# Patient Record
Sex: MALE | Race: WHITE | NOT HISPANIC OR LATINO
[De-identification: names, ages, dates, MRNs, and addresses within clinical notes are randomized per-mention and may not be internally consistent; named-entity substitution may affect disease eponyms.]

---

## 2017-01-20 ENCOUNTER — APPOINTMENT (OUTPATIENT)
Dept: ELECTROPHYSIOLOGY | Facility: CLINIC | Age: 67
End: 2017-01-20

## 2017-01-31 ENCOUNTER — MEDICATION RENEWAL (OUTPATIENT)
Age: 67
End: 2017-01-31

## 2017-02-21 ENCOUNTER — APPOINTMENT (OUTPATIENT)
Dept: ELECTROPHYSIOLOGY | Facility: CLINIC | Age: 67
End: 2017-02-21

## 2017-03-06 ENCOUNTER — MEDICATION RENEWAL (OUTPATIENT)
Age: 67
End: 2017-03-06

## 2017-03-13 ENCOUNTER — MEDICATION RENEWAL (OUTPATIENT)
Age: 67
End: 2017-03-13

## 2017-03-14 ENCOUNTER — APPOINTMENT (OUTPATIENT)
Dept: ELECTROPHYSIOLOGY | Facility: CLINIC | Age: 67
End: 2017-03-14

## 2017-03-24 ENCOUNTER — APPOINTMENT (OUTPATIENT)
Dept: ELECTROPHYSIOLOGY | Facility: CLINIC | Age: 67
End: 2017-03-24

## 2017-03-27 ENCOUNTER — MEDICATION RENEWAL (OUTPATIENT)
Age: 67
End: 2017-03-27

## 2017-04-10 ENCOUNTER — MEDICATION RENEWAL (OUTPATIENT)
Age: 67
End: 2017-04-10

## 2017-04-12 ENCOUNTER — APPOINTMENT (OUTPATIENT)
Dept: ELECTROPHYSIOLOGY | Facility: CLINIC | Age: 67
End: 2017-04-12

## 2017-04-12 VITALS
SYSTOLIC BLOOD PRESSURE: 148 MMHG | DIASTOLIC BLOOD PRESSURE: 83 MMHG | OXYGEN SATURATION: 97 % | WEIGHT: 236 LBS | BODY MASS INDEX: 33.86 KG/M2 | HEART RATE: 92 BPM

## 2017-04-24 ENCOUNTER — APPOINTMENT (OUTPATIENT)
Dept: ELECTROPHYSIOLOGY | Facility: CLINIC | Age: 67
End: 2017-04-24

## 2017-05-26 ENCOUNTER — APPOINTMENT (OUTPATIENT)
Dept: ELECTROPHYSIOLOGY | Facility: CLINIC | Age: 67
End: 2017-05-26

## 2017-06-05 ENCOUNTER — OTHER (OUTPATIENT)
Age: 67
End: 2017-06-05

## 2017-06-05 ENCOUNTER — RX RENEWAL (OUTPATIENT)
Age: 67
End: 2017-06-05

## 2017-06-26 ENCOUNTER — APPOINTMENT (OUTPATIENT)
Dept: ELECTROPHYSIOLOGY | Facility: CLINIC | Age: 67
End: 2017-06-26

## 2017-07-21 ENCOUNTER — RX RENEWAL (OUTPATIENT)
Age: 67
End: 2017-07-21

## 2017-07-28 ENCOUNTER — APPOINTMENT (OUTPATIENT)
Dept: ELECTROPHYSIOLOGY | Facility: CLINIC | Age: 67
End: 2017-07-28
Payer: MEDICARE

## 2017-07-28 PROCEDURE — 93299: CPT

## 2017-07-28 PROCEDURE — 93298 REM INTERROG DEV EVAL SCRMS: CPT

## 2017-08-07 ENCOUNTER — RX RENEWAL (OUTPATIENT)
Age: 67
End: 2017-08-07

## 2017-08-22 ENCOUNTER — MEDICATION RENEWAL (OUTPATIENT)
Age: 67
End: 2017-08-22

## 2017-08-28 ENCOUNTER — APPOINTMENT (OUTPATIENT)
Dept: ELECTROPHYSIOLOGY | Facility: CLINIC | Age: 67
End: 2017-08-28
Payer: MEDICARE

## 2017-08-28 PROCEDURE — 93299: CPT

## 2017-08-28 PROCEDURE — 93298 REM INTERROG DEV EVAL SCRMS: CPT

## 2017-09-18 ENCOUNTER — APPOINTMENT (OUTPATIENT)
Dept: CARDIOLOGY | Facility: CLINIC | Age: 67
End: 2017-09-18
Payer: MEDICARE

## 2017-09-18 ENCOUNTER — NON-APPOINTMENT (OUTPATIENT)
Age: 67
End: 2017-09-18

## 2017-09-18 VITALS
HEART RATE: 54 BPM | SYSTOLIC BLOOD PRESSURE: 129 MMHG | DIASTOLIC BLOOD PRESSURE: 79 MMHG | OXYGEN SATURATION: 97 % | WEIGHT: 242 LBS | BODY MASS INDEX: 34.72 KG/M2

## 2017-09-18 DIAGNOSIS — Z87.898 PERSONAL HISTORY OF OTHER SPECIFIED CONDITIONS: ICD-10-CM

## 2017-09-18 PROCEDURE — 93000 ELECTROCARDIOGRAM COMPLETE: CPT

## 2017-09-18 PROCEDURE — 99204 OFFICE O/P NEW MOD 45 MIN: CPT

## 2017-09-29 ENCOUNTER — APPOINTMENT (OUTPATIENT)
Dept: ELECTROPHYSIOLOGY | Facility: CLINIC | Age: 67
End: 2017-09-29
Payer: MEDICARE

## 2017-09-29 PROCEDURE — 93299: CPT

## 2017-09-29 PROCEDURE — 93298 REM INTERROG DEV EVAL SCRMS: CPT

## 2017-10-05 ENCOUNTER — MEDICATION RENEWAL (OUTPATIENT)
Age: 67
End: 2017-10-05

## 2017-10-30 ENCOUNTER — APPOINTMENT (OUTPATIENT)
Dept: ELECTROPHYSIOLOGY | Facility: CLINIC | Age: 67
End: 2017-10-30
Payer: MEDICARE

## 2017-10-30 PROCEDURE — 93298 REM INTERROG DEV EVAL SCRMS: CPT

## 2017-10-30 PROCEDURE — 93299: CPT

## 2017-11-22 ENCOUNTER — APPOINTMENT (OUTPATIENT)
Dept: ELECTROPHYSIOLOGY | Facility: CLINIC | Age: 67
End: 2017-11-22

## 2017-11-30 ENCOUNTER — MEDICATION RENEWAL (OUTPATIENT)
Age: 67
End: 2017-11-30

## 2017-12-01 ENCOUNTER — APPOINTMENT (OUTPATIENT)
Dept: ELECTROPHYSIOLOGY | Facility: CLINIC | Age: 67
End: 2017-12-01
Payer: MEDICARE

## 2017-12-01 PROCEDURE — 93298 REM INTERROG DEV EVAL SCRMS: CPT

## 2017-12-01 PROCEDURE — 93299: CPT

## 2017-12-13 ENCOUNTER — APPOINTMENT (OUTPATIENT)
Dept: ELECTROPHYSIOLOGY | Facility: CLINIC | Age: 67
End: 2017-12-13

## 2018-01-02 ENCOUNTER — APPOINTMENT (OUTPATIENT)
Dept: ELECTROPHYSIOLOGY | Facility: CLINIC | Age: 68
End: 2018-01-02
Payer: MEDICARE

## 2018-01-02 PROCEDURE — 93298 REM INTERROG DEV EVAL SCRMS: CPT

## 2018-01-02 PROCEDURE — 93299: CPT

## 2018-01-08 ENCOUNTER — MEDICATION RENEWAL (OUTPATIENT)
Age: 68
End: 2018-01-08

## 2018-02-02 ENCOUNTER — APPOINTMENT (OUTPATIENT)
Dept: ELECTROPHYSIOLOGY | Facility: CLINIC | Age: 68
End: 2018-02-02
Payer: MEDICARE

## 2018-02-02 PROCEDURE — 93299: CPT

## 2018-02-02 PROCEDURE — 93298 REM INTERROG DEV EVAL SCRMS: CPT

## 2018-03-05 ENCOUNTER — APPOINTMENT (OUTPATIENT)
Dept: ELECTROPHYSIOLOGY | Facility: CLINIC | Age: 68
End: 2018-03-05
Payer: MEDICARE

## 2018-03-05 PROCEDURE — 93299: CPT

## 2018-03-05 PROCEDURE — 93298 REM INTERROG DEV EVAL SCRMS: CPT

## 2018-04-06 ENCOUNTER — APPOINTMENT (OUTPATIENT)
Dept: ELECTROPHYSIOLOGY | Facility: CLINIC | Age: 68
End: 2018-04-06
Payer: MEDICARE

## 2018-04-06 PROCEDURE — 93298 REM INTERROG DEV EVAL SCRMS: CPT

## 2018-04-06 PROCEDURE — 93299: CPT

## 2018-04-20 ENCOUNTER — MEDICATION RENEWAL (OUTPATIENT)
Age: 68
End: 2018-04-20

## 2018-05-07 ENCOUNTER — APPOINTMENT (OUTPATIENT)
Dept: ELECTROPHYSIOLOGY | Facility: CLINIC | Age: 68
End: 2018-05-07
Payer: MEDICARE

## 2018-05-07 PROCEDURE — 93298 REM INTERROG DEV EVAL SCRMS: CPT

## 2018-05-07 PROCEDURE — 93299: CPT

## 2018-05-21 ENCOUNTER — APPOINTMENT (OUTPATIENT)
Dept: CARDIOLOGY | Facility: CLINIC | Age: 68
End: 2018-05-21
Payer: MEDICARE

## 2018-05-21 ENCOUNTER — NON-APPOINTMENT (OUTPATIENT)
Age: 68
End: 2018-05-21

## 2018-05-21 VITALS
WEIGHT: 240 LBS | OXYGEN SATURATION: 97 % | DIASTOLIC BLOOD PRESSURE: 64 MMHG | HEART RATE: 117 BPM | SYSTOLIC BLOOD PRESSURE: 130 MMHG | BODY MASS INDEX: 34.44 KG/M2

## 2018-05-21 DIAGNOSIS — C61 MALIGNANT NEOPLASM OF PROSTATE: ICD-10-CM

## 2018-05-21 PROCEDURE — 93000 ELECTROCARDIOGRAM COMPLETE: CPT

## 2018-05-21 PROCEDURE — 99214 OFFICE O/P EST MOD 30 MIN: CPT

## 2018-06-08 ENCOUNTER — MEDICATION RENEWAL (OUTPATIENT)
Age: 68
End: 2018-06-08

## 2018-06-08 ENCOUNTER — APPOINTMENT (OUTPATIENT)
Dept: ELECTROPHYSIOLOGY | Facility: CLINIC | Age: 68
End: 2018-06-08
Payer: MEDICARE

## 2018-06-08 PROCEDURE — 93298 REM INTERROG DEV EVAL SCRMS: CPT

## 2018-06-08 PROCEDURE — 93299: CPT

## 2018-06-11 ENCOUNTER — RX RENEWAL (OUTPATIENT)
Age: 68
End: 2018-06-11

## 2018-06-12 ENCOUNTER — MEDICATION RENEWAL (OUTPATIENT)
Age: 68
End: 2018-06-12

## 2018-06-19 ENCOUNTER — APPOINTMENT (OUTPATIENT)
Dept: CARDIOLOGY | Facility: CLINIC | Age: 68
End: 2018-06-19

## 2018-07-09 ENCOUNTER — APPOINTMENT (OUTPATIENT)
Dept: ELECTROPHYSIOLOGY | Facility: CLINIC | Age: 68
End: 2018-07-09
Payer: MEDICARE

## 2018-07-09 PROCEDURE — 93298 REM INTERROG DEV EVAL SCRMS: CPT

## 2018-07-09 PROCEDURE — 93299: CPT

## 2018-08-10 ENCOUNTER — APPOINTMENT (OUTPATIENT)
Dept: ELECTROPHYSIOLOGY | Facility: CLINIC | Age: 68
End: 2018-08-10
Payer: MEDICARE

## 2018-08-10 PROCEDURE — 93298 REM INTERROG DEV EVAL SCRMS: CPT

## 2018-08-10 PROCEDURE — 93299: CPT

## 2018-09-10 ENCOUNTER — APPOINTMENT (OUTPATIENT)
Dept: ELECTROPHYSIOLOGY | Facility: CLINIC | Age: 68
End: 2018-09-10
Payer: MEDICARE

## 2018-09-10 PROCEDURE — 93299: CPT

## 2018-09-10 PROCEDURE — 93298 REM INTERROG DEV EVAL SCRMS: CPT

## 2018-10-12 ENCOUNTER — APPOINTMENT (OUTPATIENT)
Dept: ELECTROPHYSIOLOGY | Facility: CLINIC | Age: 68
End: 2018-10-12
Payer: MEDICARE

## 2018-10-12 PROCEDURE — 93298 REM INTERROG DEV EVAL SCRMS: CPT

## 2018-10-12 PROCEDURE — 93299: CPT

## 2018-10-22 ENCOUNTER — MEDICATION RENEWAL (OUTPATIENT)
Age: 68
End: 2018-10-22

## 2018-10-23 ENCOUNTER — MEDICATION RENEWAL (OUTPATIENT)
Age: 68
End: 2018-10-23

## 2018-11-12 ENCOUNTER — APPOINTMENT (OUTPATIENT)
Dept: ELECTROPHYSIOLOGY | Facility: CLINIC | Age: 68
End: 2018-11-12
Payer: MEDICARE

## 2018-11-12 PROCEDURE — 93298 REM INTERROG DEV EVAL SCRMS: CPT

## 2018-11-12 PROCEDURE — 93299: CPT

## 2018-11-27 ENCOUNTER — NON-APPOINTMENT (OUTPATIENT)
Age: 68
End: 2018-11-27

## 2018-11-27 ENCOUNTER — APPOINTMENT (OUTPATIENT)
Dept: CARDIOLOGY | Facility: CLINIC | Age: 68
End: 2018-11-27
Payer: MEDICARE

## 2018-11-27 VITALS
HEIGHT: 70 IN | RESPIRATION RATE: 16 BRPM | BODY MASS INDEX: 34.07 KG/M2 | SYSTOLIC BLOOD PRESSURE: 138 MMHG | TEMPERATURE: 98 F | WEIGHT: 238 LBS | OXYGEN SATURATION: 98 % | DIASTOLIC BLOOD PRESSURE: 83 MMHG | HEART RATE: 53 BPM

## 2018-11-27 DIAGNOSIS — Z72.0 TOBACCO USE: ICD-10-CM

## 2018-11-27 PROCEDURE — 93000 ELECTROCARDIOGRAM COMPLETE: CPT

## 2018-11-27 PROCEDURE — 99214 OFFICE O/P EST MOD 30 MIN: CPT

## 2018-11-27 NOTE — REASON FOR VISIT
[Follow-Up - Clinic] : a clinic follow-up of [Atrial Fibrillation] : atrial fibrillation [Hyperlipidemia] : hyperlipidemia [Hypertension] : hypertension [FreeTextEntry1] : perioperative risk stratification

## 2018-11-27 NOTE — REVIEW OF SYSTEMS
[Eyeglasses] : currently wearing eyeglasses [Dyspnea on exertion] : dyspnea during exertion [Negative] : Heme/Lymph [Shortness Of Breath] : no shortness of breath [Chest Pain] : no chest pain [Lower Ext Edema] : no extremity edema [Palpitations] : no palpitations

## 2018-11-27 NOTE — HISTORY OF PRESENT ILLNESS
[FreeTextEntry1] : 67 male with HTN, Hyperlipidemia , ALTHEA, smoker, afib s/p DCCV April 2013, atrial flutter despite being on Multaq, underwent atrial flutter ablation in 2013, started on lasix due to LE edema. ILR implanted in 2016 which has demonstrated PAF.  He remains on eliquis. Lipid panel checked by PCP - reports normal. Overall, feels well.  No PND, orthopnea, LE edema.  No palpitations, dizziness, syncope. No chest pain or shortness of breath. No bleeding.  Golfs regularly.  Not compliant with CPAP "I would rather die in my sleep" because it keeps him up at night. Occasional etoh (6 pack) per week.  Stress test "many years ago" nml, never needed referral for coronary angiogram. Denies hx of heart failure. \par \par 5/21/18 Returns for follow up.  In the interim, diagnosed with prostate cancer.  Going for cryotherapy 5/30/18.   Continues to smoke.  No chest pain or shortness of breath.  No palpitations, dizziness, syncope.  METS > 4. Had some bleeding after the prostate biopsy.  \par \par 11/27/18 Returns for follow up.  In the interim, prostate cancer is in remission, s/p cryotherapy.  No chest pain, sob, palpitations, dizziness, bleeding.   Tolerating meds.  No significant swelling, no PND or orthopnea.  \par

## 2018-11-27 NOTE — PHYSICAL EXAM
[Well Groomed] : well groomed [General Appearance - Well Nourished] : well nourished [General Appearance - In No Acute Distress] : no acute distress [Normal Conjunctiva] : the conjunctiva exhibited no abnormalities [Eyelids - No Xanthelasma] : the eyelids demonstrated no xanthelasmas [Normal Oral Mucosa] : normal oral mucosa [Normal Oropharynx] : normal oropharynx [Respiration, Rhythm And Depth] : normal respiratory rhythm and effort [Exaggerated Use Of Accessory Muscles For Inspiration] : no accessory muscle use [Heart Rate And Rhythm] : heart rate and rhythm were normal [Heart Sounds] : normal S1 and S2 [Murmurs] : no murmurs present [Arterial Pulses Normal] : the arterial pulses were normal [Edema] : no peripheral edema present [Veins - Varicosity Changes] : no varicosital changes were noted in the lower extremities [Bowel Sounds] : normal bowel sounds [Abdomen Soft] : soft [Abdomen Tenderness] : non-tender [Abnormal Walk] : normal gait [Nail Clubbing] : no clubbing of the fingernails [Cyanosis, Localized] : no localized cyanosis [Skin Color & Pigmentation] : normal skin color and pigmentation [Skin Turgor] : normal skin turgor [] : no rash [No Venous Stasis] : no venous stasis [No Skin Ulcers] : no skin ulcer [No Xanthoma] : no  xanthoma was observed [Oriented To Time, Place, And Person] : oriented to person, place, and time [Impaired Insight] : insight and judgment were intact [Affect] : the affect was normal [Mood] : the mood was normal [No Anxiety] : not feeling anxious [Auscultation Breath Sounds / Voice Sounds] : lungs were clear to auscultation bilaterally [FreeTextEntry1] : distant heart sounds

## 2018-11-27 NOTE — ASSESSMENT
[FreeTextEntry1] : 1. paroxysmal atrial fibrillation- well controlled on dronedarone, metoprolol. Continue AC with eliquis. Continued follow up with Dr. Beck for EP. \par 2. atrial flutter s/p ablation- no recurrence since ablation. Refilled multaq. \par 3. hypertension- well controlled on amlodipine/furosemide/metoprolol. Increase exercise, weight loss. \par 4. hyperlipidemia- stable on crestor.  PCP checking yearly.  LDL goal < 100 in setting of risk factors such as smoking/htn. Increase exercise, weight loss. \par 5. smoker- long discussion regarding cessation.  He is currently not motivated to quit.  Offered therapies - gum/patch/chantix/ smoking cessation programs at [facility]. .  He defers.  Urged him to set a quit date when ready to quit. \par 6. alcohol use - discussed importance to refrain from binging, increasing bleeding risk\par 7. ALTHEA - discussed compliance with mask, long term cardiovascular effects including worsening afib. Adamant and not interested in wearing mask\par \par Thank you for allowing me to participate in your patient's care.  Please contact me if there are any questions or concerns.\par \par RTC 9 months (earlier if needed)

## 2018-12-14 ENCOUNTER — APPOINTMENT (OUTPATIENT)
Dept: ELECTROPHYSIOLOGY | Facility: CLINIC | Age: 68
End: 2018-12-14
Payer: MEDICARE

## 2018-12-14 PROCEDURE — 93298 REM INTERROG DEV EVAL SCRMS: CPT

## 2018-12-14 PROCEDURE — 93299: CPT

## 2019-01-14 ENCOUNTER — APPOINTMENT (OUTPATIENT)
Dept: ELECTROPHYSIOLOGY | Facility: CLINIC | Age: 69
End: 2019-01-14
Payer: MEDICARE

## 2019-01-14 PROCEDURE — 93298 REM INTERROG DEV EVAL SCRMS: CPT

## 2019-01-14 PROCEDURE — 93299: CPT

## 2019-02-15 ENCOUNTER — APPOINTMENT (OUTPATIENT)
Dept: ELECTROPHYSIOLOGY | Facility: CLINIC | Age: 69
End: 2019-02-15
Payer: MEDICARE

## 2019-02-15 PROCEDURE — 93298 REM INTERROG DEV EVAL SCRMS: CPT

## 2019-02-15 PROCEDURE — 93299: CPT

## 2019-02-22 ENCOUNTER — RX RENEWAL (OUTPATIENT)
Age: 69
End: 2019-02-22

## 2019-03-22 ENCOUNTER — APPOINTMENT (OUTPATIENT)
Dept: ELECTROPHYSIOLOGY | Facility: CLINIC | Age: 69
End: 2019-03-22
Payer: MEDICARE

## 2019-03-22 PROCEDURE — 93298 REM INTERROG DEV EVAL SCRMS: CPT

## 2019-03-22 PROCEDURE — 93299: CPT

## 2019-04-01 ENCOUNTER — RX RENEWAL (OUTPATIENT)
Age: 69
End: 2019-04-01

## 2019-04-22 ENCOUNTER — APPOINTMENT (OUTPATIENT)
Dept: ELECTROPHYSIOLOGY | Facility: CLINIC | Age: 69
End: 2019-04-22
Payer: MEDICARE

## 2019-04-22 PROCEDURE — 93298 REM INTERROG DEV EVAL SCRMS: CPT

## 2019-04-22 PROCEDURE — 93299: CPT

## 2019-04-25 ENCOUNTER — RX RENEWAL (OUTPATIENT)
Age: 69
End: 2019-04-25

## 2019-05-24 ENCOUNTER — APPOINTMENT (OUTPATIENT)
Dept: ELECTROPHYSIOLOGY | Facility: CLINIC | Age: 69
End: 2019-05-24
Payer: MEDICARE

## 2019-05-24 PROCEDURE — 93298 REM INTERROG DEV EVAL SCRMS: CPT

## 2019-05-24 PROCEDURE — 93299: CPT

## 2019-06-04 ENCOUNTER — RX RENEWAL (OUTPATIENT)
Age: 69
End: 2019-06-04

## 2019-06-12 ENCOUNTER — MEDICATION RENEWAL (OUTPATIENT)
Age: 69
End: 2019-06-12

## 2019-06-12 ENCOUNTER — APPOINTMENT (OUTPATIENT)
Dept: CARDIOLOGY | Facility: CLINIC | Age: 69
End: 2019-06-12

## 2019-06-24 ENCOUNTER — APPOINTMENT (OUTPATIENT)
Dept: ELECTROPHYSIOLOGY | Facility: CLINIC | Age: 69
End: 2019-06-24
Payer: MEDICARE

## 2019-06-24 PROCEDURE — 93298 REM INTERROG DEV EVAL SCRMS: CPT

## 2019-06-24 PROCEDURE — 93299: CPT

## 2019-07-09 ENCOUNTER — APPOINTMENT (OUTPATIENT)
Dept: ELECTROPHYSIOLOGY | Facility: CLINIC | Age: 69
End: 2019-07-09
Payer: MEDICARE

## 2019-07-09 VITALS
DIASTOLIC BLOOD PRESSURE: 79 MMHG | HEIGHT: 70 IN | SYSTOLIC BLOOD PRESSURE: 133 MMHG | OXYGEN SATURATION: 99 % | WEIGHT: 240 LBS | BODY MASS INDEX: 34.36 KG/M2 | HEART RATE: 74 BPM

## 2019-07-09 PROCEDURE — 93000 ELECTROCARDIOGRAM COMPLETE: CPT

## 2019-07-09 PROCEDURE — 99205 OFFICE O/P NEW HI 60 MIN: CPT | Mod: 25

## 2019-07-09 NOTE — REASON FOR VISIT
[Consultation] : a consultation regarding [Atrial Fibrillation] : atrial fibrillation [FreeTextEntry1] : Referring: Dr. Brown (former pt of Dr. Beck)

## 2019-07-09 NOTE — REVIEW OF SYSTEMS
[Eyeglasses] : currently wearing eyeglasses [Dyspnea on exertion] : dyspnea during exertion [Shortness Of Breath] : no shortness of breath [Chest Pain] : no chest pain [Lower Ext Edema] : no extremity edema [Palpitations] : no palpitations [Negative] : Heme/Lymph

## 2019-07-09 NOTE — HISTORY OF PRESENT ILLNESS
[FreeTextEntry1] : Mr. Barron is a very pleasant 65 yo male with a past medical history of HTN, hyperlipidemia, s/p typical flutter ablation, and paroxsymal AF with Multaq/ Eliquis s/p ILR implantation who presents to establish care with new electrophysiology provider. \par \par He states he continues to feel well. He denies any CP, [place], orthopnea/PND/LE edema, LH/dizziness/syncope, palpitations, or n/v/diaphoresis. \par

## 2019-07-09 NOTE — PHYSICAL EXAM
[Well Groomed] : well groomed [General Appearance - Well Nourished] : well nourished [General Appearance - In No Acute Distress] : no acute distress [Normal Conjunctiva] : the conjunctiva exhibited no abnormalities [Normal Oral Mucosa] : normal oral mucosa [Normal Oropharynx] : normal oropharynx [Eyelids - No Xanthelasma] : the eyelids demonstrated no xanthelasmas [Heart Rate And Rhythm] : heart rate and rhythm were normal [Murmurs] : no murmurs present [Heart Sounds] : normal S1 and S2 [Arterial Pulses Normal] : the arterial pulses were normal [Veins - Varicosity Changes] : no varicosital changes were noted in the lower extremities [Edema] : no peripheral edema present [FreeTextEntry1] : distant heart sounds [Exaggerated Use Of Accessory Muscles For Inspiration] : no accessory muscle use [Respiration, Rhythm And Depth] : normal respiratory rhythm and effort [Auscultation Breath Sounds / Voice Sounds] : lungs were clear to auscultation bilaterally [Bowel Sounds] : normal bowel sounds [Abdomen Soft] : soft [Abdomen Tenderness] : non-tender [Abnormal Walk] : normal gait [Nail Clubbing] : no clubbing of the fingernails [Cyanosis, Localized] : no localized cyanosis [Skin Color & Pigmentation] : normal skin color and pigmentation [Skin Turgor] : normal skin turgor [] : no rash [No Venous Stasis] : no venous stasis [No Skin Ulcers] : no skin ulcer [No Xanthoma] : no  xanthoma was observed [Oriented To Time, Place, And Person] : oriented to person, place, and time [Impaired Insight] : insight and judgment were intact [Affect] : the affect was normal [Mood] : the mood was normal [No Anxiety] : not feeling anxious

## 2019-07-09 NOTE — DISCUSSION/SUMMARY
[FreeTextEntry1] : 70 yo obese M w/HTN, HLD, atrial flutter s/p CTI RFA, and pAF s/p ILR implantation who presents with adequate rhythm control on Multaq. CHADs-VASC= 2 on Eliquis.\par \par Tolerating therapy well. No complaints. \par \par Recommendations:\par 1. Continue Multaq/BB/NOAC\par 2. RTC 6 months (or sooner prn recurrent sxs)

## 2019-07-26 ENCOUNTER — APPOINTMENT (OUTPATIENT)
Dept: ELECTROPHYSIOLOGY | Facility: CLINIC | Age: 69
End: 2019-07-26
Payer: MEDICARE

## 2019-07-26 PROCEDURE — 93299: CPT

## 2019-07-26 PROCEDURE — 93298 REM INTERROG DEV EVAL SCRMS: CPT

## 2019-08-26 ENCOUNTER — APPOINTMENT (OUTPATIENT)
Dept: ELECTROPHYSIOLOGY | Facility: CLINIC | Age: 69
End: 2019-08-26
Payer: MEDICARE

## 2019-08-26 PROCEDURE — 93299: CPT

## 2019-08-26 PROCEDURE — 93298 REM INTERROG DEV EVAL SCRMS: CPT

## 2019-08-27 ENCOUNTER — APPOINTMENT (OUTPATIENT)
Dept: CARDIOLOGY | Facility: CLINIC | Age: 69
End: 2019-08-27
Payer: MEDICARE

## 2019-08-27 VITALS
HEART RATE: 50 BPM | DIASTOLIC BLOOD PRESSURE: 78 MMHG | RESPIRATION RATE: 16 BRPM | SYSTOLIC BLOOD PRESSURE: 130 MMHG | OXYGEN SATURATION: 98 % | HEIGHT: 70 IN | WEIGHT: 234 LBS | BODY MASS INDEX: 33.5 KG/M2

## 2019-08-27 DIAGNOSIS — Z86.79 PERSONAL HISTORY OF OTHER DISEASES OF THE CIRCULATORY SYSTEM: ICD-10-CM

## 2019-08-27 DIAGNOSIS — Z86.39 PERSONAL HISTORY OF OTHER ENDOCRINE, NUTRITIONAL AND METABOLIC DISEASE: ICD-10-CM

## 2019-08-27 DIAGNOSIS — R60.0 LOCALIZED EDEMA: ICD-10-CM

## 2019-08-27 PROCEDURE — 99214 OFFICE O/P EST MOD 30 MIN: CPT

## 2019-08-27 RX ORDER — FOLIC ACID 1 MG/1
1 TABLET ORAL
Qty: 90 | Refills: 1 | Status: ACTIVE | COMMUNITY
Start: 2019-08-27

## 2019-08-27 NOTE — HISTORY OF PRESENT ILLNESS
[FreeTextEntry1] : 70 yo male with HTN, Hyperlipidemia , ALTHEA, smoker, afib s/p DCCV April 2013, atrial flutter despite being on Multaq, underwent atrial flutter ablation in 2013, started on lasix due to LE edema. ILR implanted in 2016 which has demonstrated PAF.  He remains on eliquis. Lipid panel checked by PCP - reports normal. Overall, feels well.  Golfs regularly.  Not compliant with CPAP. Occasional etoh (6 pack) per week.  Stress test "many years ago" nml, never needed referral for coronary angiogram. \par \par 5/21/18 Returns for follow up.  In the interim, diagnosed with prostate cancer.  Going for cryotherapy 5/30/18.   Continues to smoke.  No chest pain or shortness of breath.  No palpitations, dizziness, syncope.  METS > 4. Had some bleeding after the prostate biopsy.  \par \par 11/27/18 Returns for follow up.  In the interim, prostate cancer is in remission, s/p cryotherapy.  No chest pain, sob, palpitations, dizziness, bleeding.   Tolerating meds.  No significant swelling, no PND or orthopnea.  \par \par 8/27/19 Returns for follow up.  In the interim, PAF noted on ILR - 72 hrs, went to [place].  At the time he reports drinking a lot of etoh.  EF on echo reportedly normal.  He reports cholesterol is normal (via PCP).  No bleeding.  \par \par

## 2019-08-27 NOTE — REVIEW OF SYSTEMS
[Eyeglasses] : currently wearing eyeglasses [Dyspnea on exertion] : dyspnea during exertion [Negative] : Endocrine [Shortness Of Breath] : no shortness of breath [Chest Pain] : no chest pain [Lower Ext Edema] : no extremity edema [Palpitations] : no palpitations

## 2019-08-27 NOTE — ASSESSMENT
[FreeTextEntry1] : 1. paroxysmal atrial fibrillation- well controlled on dronedarone, metoprolol. Continue AC with eliquis. Continued follow up with EP/ILR checks.Had episode of sustained afib - hospitalized in [place] 6/2019.  Getting records. \par 2. atrial flutter s/p ablation- no recurrence since ablation. continue dronedarone. \par 3. hypertension- well controlled on amlodipine/furosemide/metoprolol. Increase exercise, weight loss. \par 4. hyperlipidemia- stable on crestor.  PCP checking yearly.  LDL goal < 100 in setting of risk factors such as smoking/htn. Increase exercise, weight loss. \par 5. smoker- reports quitting 4 months ago. \par 6. alcohol use - discussed importance to refrain from binging, increasing bleeding risk\par 7. ALTHEA - discussed compliance with mask, long term cardiovascular effects including worsening afib. Adamant and not interested in wearing mask\par \par \par get records from [place] 6/2018\par no changes in meds\par lipids/lfts/chemistries with PCP\par routine ILR checks\par \par Thank you for allowing me to participate in your patient's care.  Please contact me if there are any questions or concerns.\par \par RTC 9 months (earlier if needed)\par \par

## 2019-08-27 NOTE — PHYSICAL EXAM
[Well Groomed] : well groomed [General Appearance - In No Acute Distress] : no acute distress [General Appearance - Well Nourished] : well nourished [Eyelids - No Xanthelasma] : the eyelids demonstrated no xanthelasmas [Normal Conjunctiva] : the conjunctiva exhibited no abnormalities [Normal Oral Mucosa] : normal oral mucosa [Normal Oropharynx] : normal oropharynx [Respiration, Rhythm And Depth] : normal respiratory rhythm and effort [Exaggerated Use Of Accessory Muscles For Inspiration] : no accessory muscle use [Auscultation Breath Sounds / Voice Sounds] : lungs were clear to auscultation bilaterally [Heart Sounds] : normal S1 and S2 [Heart Rate And Rhythm] : heart rate and rhythm were normal [Murmurs] : no murmurs present [Edema] : no peripheral edema present [Arterial Pulses Normal] : the arterial pulses were normal [Veins - Varicosity Changes] : no varicosital changes were noted in the lower extremities [Bowel Sounds] : normal bowel sounds [Abdomen Soft] : soft [Abdomen Tenderness] : non-tender [Abnormal Walk] : normal gait [Nail Clubbing] : no clubbing of the fingernails [Cyanosis, Localized] : no localized cyanosis [Skin Turgor] : normal skin turgor [Skin Color & Pigmentation] : normal skin color and pigmentation [] : no rash [No Venous Stasis] : no venous stasis [No Xanthoma] : no  xanthoma was observed [No Skin Ulcers] : no skin ulcer [Oriented To Time, Place, And Person] : oriented to person, place, and time [Impaired Insight] : insight and judgment were intact [Affect] : the affect was normal [Mood] : the mood was normal [No Anxiety] : not feeling anxious [FreeTextEntry1] : distant heart sounds

## 2019-09-27 ENCOUNTER — APPOINTMENT (OUTPATIENT)
Dept: ELECTROPHYSIOLOGY | Facility: CLINIC | Age: 69
End: 2019-09-27

## 2019-10-28 ENCOUNTER — APPOINTMENT (OUTPATIENT)
Dept: ELECTROPHYSIOLOGY | Facility: CLINIC | Age: 69
End: 2019-10-28

## 2019-11-18 ENCOUNTER — MEDICATION RENEWAL (OUTPATIENT)
Age: 69
End: 2019-11-18

## 2019-12-18 ENCOUNTER — RX RENEWAL (OUTPATIENT)
Age: 69
End: 2019-12-18

## 2020-01-06 ENCOUNTER — MEDICATION RENEWAL (OUTPATIENT)
Age: 70
End: 2020-01-06

## 2020-01-14 ENCOUNTER — APPOINTMENT (OUTPATIENT)
Dept: ELECTROPHYSIOLOGY | Facility: CLINIC | Age: 70
End: 2020-01-14

## 2020-03-11 ENCOUNTER — APPOINTMENT (OUTPATIENT)
Dept: CARDIOLOGY | Facility: CLINIC | Age: 70
End: 2020-03-11

## 2020-03-19 ENCOUNTER — RX RENEWAL (OUTPATIENT)
Age: 70
End: 2020-03-19

## 2020-04-08 NOTE — DISCUSSION/SUMMARY
[FreeTextEntry1] : In summary this is a 66 year old male with HTN, HL, prostate cancer, ALTHEA (not compliant with CPAP), active smoker (?), CTI-dependent atrial flutter (s/p RFA 6/2013), and paroxysmal AF (on Multaq/Apixaban) s/p ILR implantation who presents for follow up.\par \par Ablation?\par CPAP use?\par EtOH use?\par ILR explant?\par \par Recommendations:\par - \par PATIENT DID NOT SHOW FOR APPOINTMENT

## 2020-04-08 NOTE — REASON FOR VISIT
[Follow-Up - Clinic] : a clinic follow-up of [Atrial Fibrillation] : atrial fibrillation [FreeTextEntry1] : Referring: Dr. Brown (former pt of Dr. Beck/Kirill)

## 2020-04-08 NOTE — HISTORY OF PRESENT ILLNESS
[FreeTextEntry1] : Ray Barron is a 66 year old male with HTN, HL, prostate cancer, ALTHEA (not compliant with CPAP), active smoker (?), CTI-dependent atrial flutter (s/p RFA 6/2013), and paroxysmal AF (on Multaq/Apixaban) s/p ILR implantation who presents for follow up.\par \par To summarize his history, he was first diagnosed with an atrial arrhythmia in 2013 incidentally without any symptoms. He underwent MARI/CV in 4/2013 at [facility]. At the time MARI showed preserved LV function and a PFO with left to right shunting. An ECG on 5/23/13 showed typical atrial flutter. He was evaluated by Dr. Germain who recommended catheter ablation of atrial flutter as he had episodes of dyspnea. On 6/13/13 he underwent catheter ablation of the CTI by Dr. Germain. Following ablation he was maintained on Dronedarone and Metoprolol Succinate. He underwent ILR implantation on 6/3/16 which revealed AF episodes lasting >4 days and so he was subsequently started on Apixaban.\par \par His most recent sustained episode of AF was in 6/2019 at which time he went to [facility]. Per chart review, this occurred in the setting of significant EtOH use and his EF on echo was normal at the time. He required __.\par \par PATIENT DID NOT SHOW FOR APPOINTMENT.\par

## 2020-04-14 ENCOUNTER — APPOINTMENT (OUTPATIENT)
Dept: ELECTROPHYSIOLOGY | Facility: CLINIC | Age: 70
End: 2020-04-14

## 2020-04-14 ENCOUNTER — APPOINTMENT (OUTPATIENT)
Dept: CARDIOLOGY | Facility: CLINIC | Age: 70
End: 2020-04-14

## 2020-06-02 ENCOUNTER — APPOINTMENT (OUTPATIENT)
Dept: CARDIOLOGY | Facility: CLINIC | Age: 70
End: 2020-06-02

## 2020-07-20 ENCOUNTER — NON-APPOINTMENT (OUTPATIENT)
Age: 70
End: 2020-07-20

## 2020-07-20 ENCOUNTER — APPOINTMENT (OUTPATIENT)
Dept: CARDIOLOGY | Facility: CLINIC | Age: 70
End: 2020-07-20
Payer: MEDICARE

## 2020-07-20 VITALS
HEART RATE: 57 BPM | TEMPERATURE: 98.1 F | BODY MASS INDEX: 32.35 KG/M2 | OXYGEN SATURATION: 95 % | DIASTOLIC BLOOD PRESSURE: 60 MMHG | HEIGHT: 70 IN | WEIGHT: 226 LBS | SYSTOLIC BLOOD PRESSURE: 107 MMHG

## 2020-07-20 PROCEDURE — 93000 ELECTROCARDIOGRAM COMPLETE: CPT

## 2020-07-20 PROCEDURE — 99214 OFFICE O/P EST MOD 30 MIN: CPT

## 2020-07-20 NOTE — ASSESSMENT
[FreeTextEntry1] : EKG 7/20/2020- Sinus bradycardia, 48/min\par \par Assessment:\par 1. PAF- on Eliquis/Multaq/Toprol\par 2. HTN/HLD and other problems.\par \par Recommendations:\par 1. ECHO.\par 2. F/U in 6 months

## 2020-07-20 NOTE — PHYSICAL EXAM
[General Appearance - Well Nourished] : well nourished [General Appearance - Well Developed] : well developed [FreeTextEntry1] : No JVD [Normal Conjunctiva] : the conjunctiva exhibited no abnormalities [Respiration, Rhythm And Depth] : normal respiratory rhythm and effort [Auscultation Breath Sounds / Voice Sounds] : lungs were clear to auscultation bilaterally [Heart Rate And Rhythm] : heart rate and rhythm were normal [Heart Sounds] : normal S1 and S2 [Abnormal Walk] : normal gait [Bowel Sounds] : normal bowel sounds [Abdomen Soft] : soft [Cyanosis, Localized] : no localized cyanosis [] : no rash [Oriented To Time, Place, And Person] : oriented to person, place, and time

## 2020-07-20 NOTE — HISTORY OF PRESENT ILLNESS
[FreeTextEntry1] : Patient is a 71 yo male with HTN, Hyperlipidemia , ALTHEA, Prostate Cancer treated by Cryotherapy, smoker, afib s/p DCCV April 2013, , underwent atrial flutter ablation in 2013, started on lasix due to LE edema. ILR implanted in 2016 which has demonstrated PAF.  He remains on eliquis/Multaq/Toprol.  ILR is no longer working.\par \par Patient is here for f/u visit. Patient reports that he is walking 2 miles  a day without any complaints. Patient states that he has stopped drinking.\par

## 2020-07-21 ENCOUNTER — APPOINTMENT (OUTPATIENT)
Dept: ELECTROPHYSIOLOGY | Facility: CLINIC | Age: 70
End: 2020-07-21
Payer: MEDICARE

## 2020-07-21 VITALS
SYSTOLIC BLOOD PRESSURE: 123 MMHG | BODY MASS INDEX: 32.43 KG/M2 | OXYGEN SATURATION: 95 % | HEART RATE: 69 BPM | DIASTOLIC BLOOD PRESSURE: 84 MMHG | WEIGHT: 226 LBS

## 2020-07-21 DIAGNOSIS — Z98.890 OTHER SPECIFIED POSTPROCEDURAL STATES: ICD-10-CM

## 2020-07-21 DIAGNOSIS — I48.92 UNSPECIFIED ATRIAL FLUTTER: ICD-10-CM

## 2020-07-21 PROCEDURE — 99213 OFFICE O/P EST LOW 20 MIN: CPT

## 2020-07-21 NOTE — PHYSICAL EXAM
[General Appearance - Well Developed] : well developed [General Appearance - Well Nourished] : well nourished [Respiration, Rhythm And Depth] : normal respiratory rhythm and effort [] : no respiratory distress [Heart Rate And Rhythm] : heart rate and rhythm were normal [Auscultation Breath Sounds / Voice Sounds] : lungs were clear to auscultation bilaterally [Heart Sounds] : normal S1 and S2 [Arterial Pulses Normal] : the arterial pulses were normal [Murmurs] : no murmurs present [Edema] : no peripheral edema present [Abnormal Walk] : normal gait [Oriented To Time, Place, And Person] : oriented to person, place, and time

## 2020-07-21 NOTE — DISCUSSION/SUMMARY
[FreeTextEntry1] : Mr. Barron is a 70 year old male with history of HTN, hyperlipidemia, s/p typical atrial flutter ablation (6/13/13 by Dr. Germain), and PAF on multaq/eliquis s/p ILR implantation (6/3/2016). ILR has reached EOS. Last interrogation 8/26/2019 revealed no recent AF, Lifetime burden 6.5%. Per patient symptomatic episodes always correlated with ETOH intake, he has since avoided drinking alcohol and has had no symptomatic recurrence in the past year.  \par \par Denies chest pain, shortness of breath, lightheadedness, dizziness, near syncope, syncope. Tolerating multaq and Eliquis without complaints of side effects, has LFT's checked with PMD regularly. \par \par Recommendation:\par \par -Heart rhythm strategies discussed in past with patient, and patient again today expressed desire to continue antiarrhtyhmic therapy with Multaq. He has had no symptomatic recurrence since curtailing ETOH intake. To continue Eliquis for stroke prophylaxis chads2 vasc 2.\par -Loop at EOS, we discussed loop explant versus explant/ reimplantation and at this point in time he wishes to leave in place. He understands it is no longer recording, and to contact us with any return of symptoms. Counseled to notify office if any pain/redness develops to site. \par - To continue cardiology follow up with Dr. Rodriguez, and EP follow up as needed. \par \par Lissa Baker ANP-C

## 2020-07-21 NOTE — HISTORY OF PRESENT ILLNESS
[FreeTextEntry1] : Mr. Barron is a 70 year old male with history of HTN, hyperlipidemia, s/p typical atrial flutter ablation (6/13/13 by Dr. Germain), and PAF on multaq/eliquis s/p ILR implantation (6/3/2016). ILR has reached EOS. Last interrogation 8/26/2019 revealed no recent AF, Lifetime burden 6.5%. Per patient symptomatic episodes always correlated with ETOH intake, he has since avoided drinking alcohol and has had no symptomatic recurrence in the past year.  \par \par Denies chest pain, shortness of breath, lightheadedness, dizziness, near syncope, syncope. Tolerating multaq and Eliquis without complaints of side effects, has LFT's checked with PMD regularly. \par \par

## 2020-07-21 NOTE — REVIEW OF SYSTEMS
[Headache] : no headache [Feeling Fatigued] : not feeling fatigued [Shortness Of Breath] : no shortness of breath [Chest Pain] : no chest pain [Dyspnea on exertion] : not dyspnea during exertion [Lower Ext Edema] : no extremity edema [Palpitations] : no palpitations [Cough] : no cough [Nausea] : no nausea [Dizziness] : no dizziness [Vomiting] : no vomiting [Easy Bleeding] : no tendency for easy bleeding [Easy Bruising] : no tendency for easy bruising

## 2020-08-13 ENCOUNTER — APPOINTMENT (OUTPATIENT)
Dept: CARDIOLOGY | Facility: CLINIC | Age: 70
End: 2020-08-13
Payer: MEDICARE

## 2020-08-13 PROCEDURE — 93306 TTE W/DOPPLER COMPLETE: CPT

## 2021-06-25 ENCOUNTER — NON-APPOINTMENT (OUTPATIENT)
Age: 71
End: 2021-06-25

## 2021-06-25 ENCOUNTER — APPOINTMENT (OUTPATIENT)
Dept: CARDIOLOGY | Facility: CLINIC | Age: 71
End: 2021-06-25
Payer: MEDICARE

## 2021-06-25 VITALS
TEMPERATURE: 98.3 F | BODY MASS INDEX: 30.92 KG/M2 | DIASTOLIC BLOOD PRESSURE: 73 MMHG | HEIGHT: 70 IN | OXYGEN SATURATION: 96 % | SYSTOLIC BLOOD PRESSURE: 104 MMHG | HEART RATE: 80 BPM | WEIGHT: 216 LBS

## 2021-06-25 DIAGNOSIS — I48.91 UNSPECIFIED ATRIAL FIBRILLATION: ICD-10-CM

## 2021-06-25 PROCEDURE — 93000 ELECTROCARDIOGRAM COMPLETE: CPT

## 2021-06-25 PROCEDURE — 99213 OFFICE O/P EST LOW 20 MIN: CPT

## 2021-06-25 NOTE — HISTORY OF PRESENT ILLNESS
[FreeTextEntry1] : Patient is a 72 yo male with HTN, Hyperlipidemia , ALTHEA, Prostate Cancer treated by Cryotherapy, smoker, afib s/p DCCV April 2013, , underwent atrial flutter ablation in 2013, started on lasix due to LE edema. ILR implanted in 2016 which has demonstrated PAF.  He remains on eliquis/Multaq/Toprol.  ILR is no longer working.Patient states that he has stopped drinking.\par \par \par Patient is here for f/u visit. Patient reports that he is walking 4 miles  a day without any complaints. Patient denies any COVID-19 infection since last visit

## 2021-06-25 NOTE — ASSESSMENT
[FreeTextEntry1] : EKG 7/20/2020- Sinus bradycardia, 48/min\par \par Echocardiogram August 13, 2020: LVEF 55 to 60%.  Normal echo\par \par EKG 6/25/2021- Atrial fibrillation, Rate 74/min\par \par Assessment:\par 1. PAF- on Eliquis/Multaq/Toprol\par 2. HTN/HLD and other problems.\par \par Recommendations:\par 1. Continue current medical therapy\par 2. F/U in 6 months

## 2021-09-13 ENCOUNTER — NON-APPOINTMENT (OUTPATIENT)
Age: 71
End: 2021-09-13

## 2021-11-04 ENCOUNTER — RESULT REVIEW (OUTPATIENT)
Age: 71
End: 2021-11-04

## 2021-11-29 ENCOUNTER — RX RENEWAL (OUTPATIENT)
Age: 71
End: 2021-11-29

## 2021-11-29 ENCOUNTER — APPOINTMENT (OUTPATIENT)
Dept: CARDIOLOGY | Facility: CLINIC | Age: 71
End: 2021-11-29
Payer: MEDICARE

## 2021-11-29 VITALS
WEIGHT: 214 LBS | BODY MASS INDEX: 30.64 KG/M2 | HEIGHT: 70 IN | OXYGEN SATURATION: 96 % | SYSTOLIC BLOOD PRESSURE: 124 MMHG | DIASTOLIC BLOOD PRESSURE: 72 MMHG | HEART RATE: 58 BPM | TEMPERATURE: 98.5 F

## 2021-11-29 DIAGNOSIS — Z01.810 ENCOUNTER FOR PREPROCEDURAL CARDIOVASCULAR EXAMINATION: ICD-10-CM

## 2021-11-29 PROCEDURE — 99213 OFFICE O/P EST LOW 20 MIN: CPT

## 2021-11-29 PROCEDURE — 93000 ELECTROCARDIOGRAM COMPLETE: CPT

## 2021-12-02 ENCOUNTER — APPOINTMENT (OUTPATIENT)
Dept: CARDIOLOGY | Facility: CLINIC | Age: 71
End: 2021-12-02
Payer: MEDICARE

## 2021-12-02 PROCEDURE — 93015 CV STRESS TEST SUPVJ I&R: CPT

## 2021-12-02 PROCEDURE — A9500: CPT

## 2021-12-02 PROCEDURE — 78452 HT MUSCLE IMAGE SPECT MULT: CPT

## 2021-12-07 ENCOUNTER — NON-APPOINTMENT (OUTPATIENT)
Age: 71
End: 2021-12-07

## 2021-12-07 NOTE — HISTORY OF PRESENT ILLNESS
[FreeTextEntry1] : Patient is a 70 yo male with HTN, Hyperlipidemia , ALTHEA, Prostate Cancer treated by Cryotherapy, smoker, afib s/p DCCV April 2013, , underwent atrial flutter ablation in 2013, started on lasix due to LE edema. ILR implanted in 2016 which has demonstrated PAF.  He remains on eliquis/Multaq/Toprol.  ILR is no longer working.Patient states that he has stopped drinking.\par \par \par Patient is here for f/u visit. Patient reports that he is walking 3 miles  a day without any complaints. Patient reports that he had been diagnosed with the throat cancer month ago in October 2021, patient needs to have surgery.  Patient presents for a preoperative cardiac risk assessment.  Patient had a wide-complex tachycardia for which she was advised to have a nuclear stress test which she has not done yet.  Patient is a former smoker.  Patient denies any chest pain, dyspnea palpitations or syncope.

## 2021-12-07 NOTE — ASSESSMENT
[FreeTextEntry1] : EKG 7/20/2020- Sinus bradycardia, 48/min\par \par Echocardiogram August 13, 2020: LVEF 55 to 60%.  Normal echo\par \par EKG 6/25/2021- Atrial fibrillation, Rate 74/min\par \par Patient's Holter monitor reviewed-5 beat wide-complex tachycardia noted. Brief episodes of PSVT noted\par \par Recommendations:\par Patient to have a nuclear stress test\par Follow-up in the office after the nuclear stress test. \par \par EKG 11/29/2021-Sinuc bradycardia. \par \par Assessment:\par 1. PAF- on Eliquis/Multaq/Toprol\par 2. HTN/HLD and other problems.\par 3. Wide complex tachycardia- 5 beat\par 4. Throat Cancer\par \par Recommendations:\par 1. Continue current medical therapy\par 2. F/U in2 months\par 3. Nulcear Stress Test\par \par Patient advised to stop Eliquis 2 days before surgery.  Following surgery patient should resume Eliquis when cleared by the surgeon.\par \par Patient preoperative cardiac risk assessment to be addressed after the nuclear stress test.

## 2021-12-07 NOTE — ADDENDUM
[FreeTextEntry1] : Nuclear stress test 12/2/2021- Normal.\par \par Patient has good exercise capacity.\par \par Patient is a low risk for perioperative cardiac events.\par \par NO ABSOLUTE CONTRAINDICATIONS TO PROCEED WITH THE HEAD AND NECK SURGERY.SURG

## 2022-01-24 ENCOUNTER — RX RENEWAL (OUTPATIENT)
Age: 72
End: 2022-01-24

## 2022-02-01 ENCOUNTER — RX RENEWAL (OUTPATIENT)
Age: 72
End: 2022-02-01

## 2022-02-14 ENCOUNTER — RX RENEWAL (OUTPATIENT)
Age: 72
End: 2022-02-14

## 2022-04-01 ENCOUNTER — APPOINTMENT (OUTPATIENT)
Dept: CARDIOLOGY | Facility: CLINIC | Age: 72
End: 2022-04-01

## 2022-06-14 ENCOUNTER — NON-APPOINTMENT (OUTPATIENT)
Age: 72
End: 2022-06-14

## 2022-07-15 ENCOUNTER — NON-APPOINTMENT (OUTPATIENT)
Age: 72
End: 2022-07-15

## 2022-07-15 ENCOUNTER — APPOINTMENT (OUTPATIENT)
Dept: CARDIOLOGY | Facility: CLINIC | Age: 72
End: 2022-07-15

## 2022-07-15 VITALS
DIASTOLIC BLOOD PRESSURE: 64 MMHG | SYSTOLIC BLOOD PRESSURE: 98 MMHG | TEMPERATURE: 98 F | BODY MASS INDEX: 34.95 KG/M2 | HEART RATE: 88 BPM | HEIGHT: 60 IN | OXYGEN SATURATION: 98 % | WEIGHT: 178 LBS

## 2022-07-15 VITALS — OXYGEN SATURATION: 96 % | HEART RATE: 94 BPM

## 2022-07-15 VITALS — SYSTOLIC BLOOD PRESSURE: 110 MMHG | DIASTOLIC BLOOD PRESSURE: 72 MMHG

## 2022-07-15 VITALS — DIASTOLIC BLOOD PRESSURE: 62 MMHG | SYSTOLIC BLOOD PRESSURE: 90 MMHG

## 2022-07-15 DIAGNOSIS — I47.2 VENTRICULAR TACHYCARDIA: ICD-10-CM

## 2022-07-15 DIAGNOSIS — I48.91 UNSPECIFIED ATRIAL FIBRILLATION: ICD-10-CM

## 2022-07-15 PROCEDURE — 93000 ELECTROCARDIOGRAM COMPLETE: CPT

## 2022-07-15 PROCEDURE — 99214 OFFICE O/P EST MOD 30 MIN: CPT

## 2022-07-15 RX ORDER — ERGOCALCIFEROL 1.25 MG/1
1.25 MG CAPSULE, LIQUID FILLED ORAL
Qty: 12 | Refills: 0 | Status: ACTIVE | COMMUNITY
Start: 2022-05-20

## 2022-07-15 RX ORDER — SULFAMETHOXAZOLE AND TRIMETHOPRIM 800; 160 MG/1; MG/1
800-160 TABLET ORAL
Qty: 28 | Refills: 0 | Status: DISCONTINUED | COMMUNITY
Start: 2022-06-14

## 2022-07-15 RX ORDER — NYSTATIN 100000 [USP'U]/ML
100000 SUSPENSION ORAL
Qty: 300 | Refills: 0 | Status: ACTIVE | COMMUNITY
Start: 2022-06-07

## 2022-07-15 RX ORDER — SULFAMETHOXAZOLE AND TRIMETHOPRIM 400; 80 MG/1; MG/1
400-80 TABLET ORAL
Qty: 28 | Refills: 0 | Status: DISCONTINUED | COMMUNITY
Start: 2022-05-11

## 2022-07-15 NOTE — ASSESSMENT
[FreeTextEntry1] : EKG 7/20/2020- Sinus bradycardia, 48/min\par \par Echocardiogram August 13, 2020: LVEF 55 to 60%.  Normal echo\par \par EKG 6/25/2021- Atrial fibrillation, Rate 74/min\par \par Patient's Holter monitor reviewed-5 beat wide-complex tachycardia noted. Brief episodes of PSVT noted\par  \par \par EKG 11/29/2021-Sinuc bradycardia. \par \par Nuclear stress test December 2, 2021: Normal study\par \par EKG 7/15/2022- Atrial fibrillation \par ABNORMAL RHYTHM\par \par Assessment:\par 1. PAF- on Eliquis/Multaq/Toprol\par 2. HTN/HLD and other problems.\par 3. Wide complex tachycardia- 5 beat\par 4. Throat Cancer-followed by [facility]\par 5.  Fatigue and tiredness\par 6.  Orthostatic hypotension\par Recommendations:\par \par Orthostatic readings done, supine pulse rate 83 blood pressure 110/72,\par Standing pulse rate 94 blood pressure 90/62.\par \par Patient has not seen his primary care physician in a while.  Patient appears to have not had any CBC that he can report.\par \par 1.  Patient has orthostatic hypotension, he was recommended to go to the emergency room, patient stated he did not want to go to the ER.  Instead he said he will go and see his primary care physician.\par 2.  Complete metabolic panel to check his LFTs since he is on Multaq\par 3.  Patient to follow-up with EP he has not seen them in a couple of years\par 4.  Echocardiogram\par 5.  Follow-up in 2 months\par

## 2022-07-15 NOTE — HISTORY OF PRESENT ILLNESS
[FreeTextEntry1] : Patient is a 71 yo male with HTN, Hyperlipidemia , ALTHEA, Prostate Cancer treated by Cryotherapy, smoker, afib s/p DCCV April 2013, , underwent atrial flutter ablation in 2013, started on lasix due to LE edema. ILR implanted in 2016 which has demonstrated PAF.  He remains on eliquis/Multaq/Toprol.  ILR is no longer working.Patient states that he has stopped drinking. . Patient reports that he had been diagnosed with the throat cancer month ago in October 2021, he underwent surgery for throat cancer.  He is followed by [facility] for throat cancer.  Patient is a former smoker.\par \par \par Patient is here for f/u visit.  Patient complains of fatigue and tiredness.  Patient denies any chest pain, dyspnea palpitations or syncope.  Patient has stopped taking amlodipine several days ago because of low blood pressure

## 2022-07-15 NOTE — PHYSICAL EXAM
[Normal] : no edema, no cyanosis, no clubbing, no varicosities [de-identified] : There is a swelling in the in front of the neck

## 2022-07-28 ENCOUNTER — APPOINTMENT (OUTPATIENT)
Dept: CARDIOLOGY | Facility: CLINIC | Age: 72
End: 2022-07-28

## 2022-08-05 ENCOUNTER — APPOINTMENT (OUTPATIENT)
Dept: CARDIOLOGY | Facility: CLINIC | Age: 72
End: 2022-08-05

## 2022-08-05 PROCEDURE — 93306 TTE W/DOPPLER COMPLETE: CPT

## 2022-09-06 ENCOUNTER — APPOINTMENT (OUTPATIENT)
Dept: ELECTROPHYSIOLOGY | Facility: CLINIC | Age: 72
End: 2022-09-06

## 2022-09-14 ENCOUNTER — RESULT REVIEW (OUTPATIENT)
Age: 72
End: 2022-09-14

## 2022-09-30 ENCOUNTER — APPOINTMENT (OUTPATIENT)
Dept: CARDIOLOGY | Facility: CLINIC | Age: 72
End: 2022-09-30